# Patient Record
Sex: FEMALE | Race: WHITE | NOT HISPANIC OR LATINO | URBAN - METROPOLITAN AREA
[De-identification: names, ages, dates, MRNs, and addresses within clinical notes are randomized per-mention and may not be internally consistent; named-entity substitution may affect disease eponyms.]

---

## 2017-05-05 ENCOUNTER — IMPORTED ENCOUNTER (OUTPATIENT)
Dept: URBAN - METROPOLITAN AREA CLINIC 50 | Facility: CLINIC | Age: 67
End: 2017-05-05

## 2021-04-17 ASSESSMENT — TONOMETRY
OS_IOP_MMHG: 15
OD_IOP_MMHG: 14

## 2021-04-17 ASSESSMENT — VISUAL ACUITY
OS_CC: 20/25
OD_CC: 20/400

## 2021-10-12 NOTE — PATIENT DISCUSSION
Pt. needs to be out of contact lenses for 1 week prior to cataract surgery but only has 1 set of very old glasses. Pt. may contact referring provider in order to get an inexpensive temporary set of spectacles.

## 2022-01-24 NOTE — PATIENT DISCUSSION
Patient advised of the right to post-operative care by the surgeon. Patient is fully informed of, and agreed to, co-management with their primary optometric physician. Post-operative care by the surgeon is not medically necessary and co-management is clinically appropriate. Patient has received itemization of fees related to cataract surgery. Transfer of care letter completed for the patient. Transfer care of left eye to Dr. Mickie Hare on 1/24/22. Patient instructed to call immediately if any new distortion, blurring, decreased vision or eye pain.